# Patient Record
Sex: FEMALE | Race: WHITE | URBAN - METROPOLITAN AREA
[De-identification: names, ages, dates, MRNs, and addresses within clinical notes are randomized per-mention and may not be internally consistent; named-entity substitution may affect disease eponyms.]

---

## 2019-04-11 ENCOUNTER — OFFICE VISIT (OUTPATIENT)
Dept: INTERNAL MEDICINE CLINIC | Age: 24
End: 2019-04-11

## 2019-04-11 VITALS
HEART RATE: 71 BPM | HEIGHT: 60 IN | WEIGHT: 108.8 LBS | SYSTOLIC BLOOD PRESSURE: 112 MMHG | BODY MASS INDEX: 21.36 KG/M2 | OXYGEN SATURATION: 98 % | TEMPERATURE: 98.3 F | RESPIRATION RATE: 14 BRPM | DIASTOLIC BLOOD PRESSURE: 83 MMHG

## 2019-04-11 DIAGNOSIS — R76.11 POSITIVE PPD: ICD-10-CM

## 2019-04-11 DIAGNOSIS — Z00.00 ROUTINE GENERAL MEDICAL EXAMINATION AT A HEALTH CARE FACILITY: Primary | ICD-10-CM

## 2019-04-11 PROBLEM — R76.12 POSITIVE QUANTIFERON-TB GOLD TEST: Status: ACTIVE | Noted: 2019-04-11

## 2019-04-11 NOTE — PROGRESS NOTES
Chief Complaint Patient presents with  Establish Care  
  needs physical form filled out 1. Have you been to the ER, urgent care clinic since your last visit? Hospitalized since your last visit? no 
 
2. Have you seen or consulted any other health care providers outside of the [address] [place] [address] since your last visit? Include any pap smears or colon screening. Yes pap in May 2018 normal results

## 2019-04-11 NOTE — PROGRESS NOTES
Subjective:  
30 y.o. female for Well Woman Check. She requires a form completion for job purposes. Drug screen required. Positive Quantiferon TB testing in 2016. Had a negative chest xray same time frame. Her gyne and breast care is done elsewhere by her Ob-Gyne physician. Patient Active Problem List  
Diagnosis Code  Positive QuantiFERON-TB Gold test R76.12  
 Wellness examination Z00.00 Patient Active Problem List  
 Diagnosis Date Noted  Wellness examination 04/12/2019  Positive QuantiFERON-TB Gold test 04/11/2019 No Known Allergies History reviewed. No pertinent past medical history. Past Surgical History:  
Procedure Laterality Date  HX HEENT Family History Problem Relation Age of Onset  Hypertension Father Social History Tobacco Use  Smoking status: Never Smoker  Smokeless tobacco: Never Used Substance Use Topics  Alcohol use: Yes Alcohol/week: 1.8 oz Types: 1 Shots of liquor, 2 Cans of beer per week Comment: kennedi ROS: Feeling generally well. No prolonged cough, hemoptysis, fever, weight loss or malaise. No dyspnea or chest pain on exertion. No abdominal pain, change in bowel habits, black or bloody stools. No urinary tract symptoms. No new or unusual musculoskeletal symptoms. Specific concerns today: none. Objective: The patient appears well, alert, oriented x 3, in no distress. Visit Vitals /83 (BP 1 Location: Left arm, BP Patient Position: Sitting) Pulse 71 Temp 98.3 °F (36.8 °C) (Oral) Resp 14 Ht 5' (1.524 m) Wt 108 lb 12.8 oz (49.4 kg) LMP 04/11/2019 SpO2 98% BMI 21.25 kg/m² ENT normal Tms, nares and oropharynx. Neck supple. No adenopathy or thyromegaly. RONDA. Lungs are clear, good air entry, no wheezes, rhonchi or rales. S1 and S2 normal, no murmurs, regular rate and rhythm. Abdomen soft without tenderness, guarding, mass or organomegaly.  Extremities show no edema, normal peripheral pulses, no joint findings, good ROM. MSK: spine without obvious curvature, non-tender. Neurological is normal, no focal findings. DTRs intact Breast and Pelvic exams are deferred. Assessment/Plan:  
Well Woman ICD-10-CM ICD-9-CM 1. Routine general medical examination at a health care facility Z00.00 V70.0 DRUG SCREEN, URINE - IMMUNOASSAY 9 W/REFLEX CONFIRM

## 2019-04-12 PROBLEM — Z00.00 WELLNESS EXAMINATION: Status: ACTIVE | Noted: 2019-04-12

## 2019-04-15 LAB
ALPRAZ UR QL: NEGATIVE
AMPHETAMINES UR QL SCN: NEGATIVE NG/ML
BARBITURATES UR QL SCN: NEGATIVE NG/ML
BENZODIAZ UR QL: NEGATIVE NG/ML
BZE UR QL SCN: NEGATIVE NG/ML
CANNABINOIDS UR QL SCN: NEGATIVE NG/ML
CLONAZEPAM UR QL: NEGATIVE
CREAT UR-MCNC: 259.7 MG/DL (ref 20–300)
FLURAZEPAM UR QL: NEGATIVE
LORAZEPAM UR QL: NEGATIVE
METHADONE UR QL SCN: NEGATIVE NG/ML
MIDAZOLAM UR QL CFM: NEGATIVE
NORDIAZEPAM UR QL: NEGATIVE
OPIATES UR QL SCN: NEGATIVE NG/ML
OXAZEPAM UR QL: NEGATIVE
OXYCODONE+OXYMORPHONE UR QL SCN: NEGATIVE NG/ML
PCP UR QL: NEGATIVE NG/ML
PH UR: 5.7 [PH] (ref 4.5–8.9)
PLEASE NOTE:, 733157: NORMAL
PROPOXYPH UR QL SCN: NEGATIVE NG/ML
SP GR UR: 1.02
TEMAZEPAM UR QL CFM: NEGATIVE
TRIAZOLAM UR QL: NEGATIVE

## 2019-05-07 ENCOUNTER — CLINICAL SUPPORT (OUTPATIENT)
Dept: INTERNAL MEDICINE CLINIC | Age: 24
End: 2019-05-07

## 2019-05-07 DIAGNOSIS — R76.11 POSITIVE PPD: Primary | ICD-10-CM

## 2019-09-20 PROBLEM — Z00.00 WELLNESS EXAMINATION: Status: RESOLVED | Noted: 2019-04-12 | Resolved: 2019-09-20
